# Patient Record
Sex: MALE | Race: BLACK OR AFRICAN AMERICAN | NOT HISPANIC OR LATINO | Employment: FULL TIME | URBAN - NONMETROPOLITAN AREA
[De-identification: names, ages, dates, MRNs, and addresses within clinical notes are randomized per-mention and may not be internally consistent; named-entity substitution may affect disease eponyms.]

---

## 2020-12-23 ENCOUNTER — TRANSCRIBE ORDERS (OUTPATIENT)
Dept: PHYSICAL THERAPY | Facility: HOSPITAL | Age: 50
End: 2020-12-23

## 2020-12-23 DIAGNOSIS — M23.306 OTHER MENISCUS DERANGEMENTS, UNSPECIFIED MENISCUS, RIGHT KNEE: Primary | ICD-10-CM

## 2020-12-28 ENCOUNTER — HOSPITAL ENCOUNTER (OUTPATIENT)
Dept: PHYSICAL THERAPY | Facility: HOSPITAL | Age: 50
Setting detail: THERAPIES SERIES
Discharge: HOME | End: 2020-12-28

## 2020-12-28 DIAGNOSIS — M23.306 DERANGEMENT OF MENISCUS, RIGHT: Primary | ICD-10-CM

## 2020-12-28 PROCEDURE — 97161 PT EVAL LOW COMPLEX 20 MIN: CPT

## 2020-12-28 NOTE — THERAPY EVALUATION
Outpatient Physical Therapy Ortho Initial Evaluation  [facility]     Patient Name: Leonard Beltran Jr.  : 1970  MRN: [id number]  Today's Date: 2020      Visit Date: 2020     ATTENDANCE:   SUBJECTIVE IMPROVEMENT: n/a  NEXT MD APPOINTMENT: 2020  RECERT DATE: 2020    THERAPY DIAGNOSIS: R ACL tear- pre-op      There is no problem list on file for this patient.       History reviewed. No pertinent past medical history.     Past Surgical History:   Procedure Laterality Date   • KNEE ARTHROSCOPY Right     2 scopes and 1 other surgery for R knee injury from football about 26 years ago.       Visit Dx:     ICD-10-CM ICD-9-CM   1. Derangement of meniscus, right  M23.306 717.5         Patient History     Row Name 20 1500             History    Chief Complaint  Pain  -AC      Type of Pain  Knee pain  -AC      Brief Description of Current Complaint  Pt notes that he was hopping off a flat bed truck and heard a pop and had immediate swelling. He notes initial injury 10/15/2020. He notes that since injury he has had x-ray and MRI which showed ACL tear. He notes that he has brace from knee injury about 26 years ago and throughout it with him but he has not used it to this point and MD has not stated that he needs. he notes that he sees MD on 2021 to speak with surgeon about surgical options. He notes that he has very active job notes that currently he is on light duty at work and has 6 kids at home with wife who help out as needed.   -AC      Patient/Caregiver Goals  Return to work;Improve mobility;Improve strength  -AC      Current Tobacco Use  no  -AC      Patient's Rating of General Health  Good  -AC      Occupation/sports/leisure activities  .   -AC      What clinical tests have you had for this problem?  X-ray;MRI  -AC         Pain     Pain Location  Knee  -AC      Pain at Present  0  -AC      Pain at Best  0  -AC      Pain at Worst  10  -AC      Pain Frequency   Rarely  -AC      What Performance Factors Make the Current Problem(s) WORSE?  has been taking it easy so has not hurt in a while.   -AC      Is your sleep disturbed?  No  -AC      Difficulties at work?  light duty only per MD.   -AC      Difficulties with ADL's?  Notes he has to be careful stepping into/out of shower.   -AC         Fall Risk Assessment    Any falls in the past year:  No  -AC         Daily Activities    Primary Language  English  -AC        User Key  (r) = Recorded By, (t) = Taken By, (c) = Cosigned By    Initials Name Provider Type    AC Adelina Murphy, PT Physical Therapist          PT Ortho     Row Name 12/28/20 1500       Subjective Comments    Subjective Comments  see pt hx.   -AC       Precautions and Contraindications    Precautions/Limitations  no known precautions/limitations  -AC       Subjective Pain    Able to rate subjective pain?  yes  -AC    Pre-Treatment Pain Level  0  -AC    Post-Treatment Pain Level  0  -AC       Posture/Observations    Posture/Observations Comments  good gait mechanics noted this date. no AD or bracing.   -AC       Knee Palpation    Medial Joint Line  Right:;Tender  -AC       Patellar Accessory Motions    Superior glide  Right:;WNL  -AC    Inferior glide  Right:;WNL  -AC    Medial glide  Right:;WNL  -AC    Lateral glide  Right:;WNL  -AC       Right Lower Ext    Rt Knee Extension/Flexion AROM  0-132  -AC       MMT (Manual Muscle Testing)    General MMT Comments  LLE 5/5. RLE 4+/5 hip flexion/adduction and knee extension.   -AC       Sensation    Sensation WNL?  WNL  -AC    Additional Comments  notes numbness along anterior/inferior patella since previous sx.   -AC       Balance Skills Training    SLS  10s bilaterally with increased difficulty with RLE.  -AC    Balance Comments  tandem stance 30s bilaterally no sway.   -AC       Gait/Stairs (Locomotion)    Comment (Gait/Stairs)  good gait mechanics noted this date. SL eccentric lowering on RL increased medial  knee pain.   -AC      User Key  (r) = Recorded By, (t) = Taken By, (c) = Cosigned By    Initials Name Provider Type    Adelina Brambila, PT Physical Therapist                      Therapy Education  Education Details: HS, piriformis, and gastroc/soleus stretching. SLR, hip abduction, HS curls, LAQ  Given: HEP  Program: New  How Provided: Verbal, Demonstration, Written  Provided to: Patient  Level of Understanding: Verbalized     PT OP Goals     Row Name 12/28/20 1500          PT Short Term Goals    STG Date to Achieve  01/25/21  -AC     STG 1  independent with HEP.   -AC     STG 1 Progress  New  -AC     STG 2  No increase in pain with HEP/treatment sessions.   -AC     STG 2 Progress  New  -AC     STG 3  Pt has improved RLE strength 5/5 in all planes.   -AC     STG 3 Progress  New  -AC     STG 4  SLS eyes open and closed 15s bilaterally with no LOB.   -AC     STG 4 Progress  New  -AC     STG 5  No flexibility limitations in HS and piriformis/external rotators noted.   -AC     STG 5 Progress  New  -        Time Calculation    PT Goal Re-Cert Due Date  01/18/21  -       User Key  (r) = Recorded By, (t) = Taken By, (c) = Cosigned By    Initials Name Provider Type    Adelina Brambila, PT Physical Therapist          PT Assessment/Plan     Row Name 12/28/20 1500          PT Assessment    Functional Limitations  Impaired locomotion;Performance in work activities;Limitations in functional capacity and performance  -AC     Impairments  Balance;Coordination;Endurance;Impaired flexibility;Muscle strength  -AC     Assessment Comments  The pt is a 49 y/o male who presents with ACL tear following injury at work. He is currently waiting to have appointment with surgeon regarding surgery options for repair and he stats he would like to have repair done if MD recommends due to demand on his knee from his job. He presents with AROM WNL and minimal pain this date. He has decreased HS, hip external rotator, and  gastroc/soleus flexibility limitations noted this date. He has mild decreased RLE hip flexion, adduction, and knee extension noted with comparison to LLE. He notes previous hx of R knee injuries. He has mild decreased gait speed with 1100 ft in 6 MWT however good gait mechanics are noted. Pain increase with RLE SL eccentric lowering shows decreased motor control. He has increased difficulty with RLE SLS however is able to complete 10s hold. He is appropriate for skilled PT to improve flexibility and strength in the RLE to prepare for possible surgery to allow for improved post-op rehab and improve recovery time to allow for RTW as soon as possible. Pt was given HEP this date for stretching/strength activities to complete at home. He was educated on POC for 1x per week unless surgeon suggests otherwise at next follow up appointment regarding surgical options.   -AC     Please refer to paper survey for additional self-reported information  Yes  -AC     Rehab Potential  Good  -AC     Patient/caregiver participated in establishment of treatment plan and goals  Yes  -AC     Patient would benefit from skilled therapy intervention  Yes  -AC        PT Plan    PT Frequency  1x/week  -AC     Predicted Duration of Therapy Intervention (PT)  4 weeks   -AC     PT Plan Comments  LE stretching/strength, balance/stability training, and motor control training in the RLE for pre-op rehab.   -AC       User Key  (r) = Recorded By, (t) = Taken By, (c) = Cosigned By    Initials Name Provider Type    Adelina Brambila PT Physical Therapist            OP Exercises     Row Name 12/28/20 1500             Subjective Comments    Subjective Comments  see pt hx.   -AC         Subjective Pain    Able to rate subjective pain?  yes  -AC      Pre-Treatment Pain Level  0  -AC      Post-Treatment Pain Level  0  -AC        User Key  (r) = Recorded By, (t) = Taken By, (c) = Cosigned By    Initials Name Provider Type    Adelina Brambila, PT  Physical Therapist                        Outcome Measure Options: 6 Minute Walk Test, Lower Extremity Functional Scale (LEFS)  6 Minute Walk Test  Distance: 1100  Device Used: No  # of Rest Breaks: 0  6 Minute Walk Comments: mild decreased   Lower Extremity Functional Index  Any of your usual work, housework or school activities: No difficulty  Your usual hobbies, recreational or sporting activities: Quite a bit of difficulty  Getting into or out of the bath: No difficulty  Walking between rooms: No difficulty  Putting on your shoes or socks: No difficulty  Squatting: Moderate difficulty  Lifting an object, like a bag of groceries from the floor: No difficulty  Performing light activities around your home: No difficulty  Performing heavy activities around your home: A little bit of difficulty  Getting into or out of a car: No difficulty  Walking 2 blocks: A little bit of difficulty  Walking a mile: A little bit of difficulty  Going up or down 10 stairs (about 1 flight of stairs): No difficulty  Standing for 1 hour: No difficulty  Sitting for 1 hour: No difficulty  Running on even ground: A little bit of difficulty  Running on uneven ground: A little bit of difficulty  Making sharp turns while running fast: Quite a bit of difficulty  Hopping: A little bit of difficulty  Rolling over in bed: No difficulty  Total: 66      Time Calculation:     Start Time: 1500  Stop Time: 1540  Time Calculation (min): 40 min     Therapy Charges for Today     Code Description Service Date Service Provider Modifiers Qty    25226881829 HC PT EVAL LOW COMPLEXITY 3 12/28/2020 Adelina Murphy, PT GP 1                   Adelina Murphy, PT  12/28/2020

## 2021-01-08 ENCOUNTER — HOSPITAL ENCOUNTER (OUTPATIENT)
Dept: PHYSICAL THERAPY | Facility: HOSPITAL | Age: 51
Setting detail: THERAPIES SERIES
Discharge: HOME | End: 2021-01-08

## 2021-01-08 DIAGNOSIS — M23.306 DERANGEMENT OF MENISCUS, RIGHT: Primary | ICD-10-CM

## 2021-01-08 PROCEDURE — 97110 THERAPEUTIC EXERCISES: CPT

## 2021-01-08 NOTE — THERAPY TREATMENT NOTE
Outpatient Physical Therapy Ortho Treatment Note  [facility]     Patient Name: Leonard Beltran Jr.  : 1970  MRN: [id number]  Today's Date: 2021      Visit Date: 2021  Subjective Improvement: 0  MD visit: ELIZABETH  Visit Number:   Total Approved:4 visits  Recert Date: 2021  Visit Dx:    ICD-10-CM ICD-9-CM   1. Derangement of meniscus, right  M23.306 717.5       There is no problem list on file for this patient.       No past medical history on file.     Past Surgical History:   Procedure Laterality Date   • KNEE ARTHROSCOPY Right     2 scopes and 1 other surgery for R knee injury from football about 26 years ago.       PT Ortho     Row Name 21 1000       Subjective Comments    Subjective Comments  pt reports will have TKR on right.  -TL       Precautions and Contraindications    Precautions/Limitations  no known precautions/limitations  -TL       Subjective Pain    Able to rate subjective pain?  yes  -TL    Pre-Treatment Pain Level  0  -TL    Post-Treatment Pain Level  0  -TL      User Key  (r) = Recorded By, (t) = Taken By, (c) = Cosigned By    Initials Name Provider Type    Sloane Rodriguez, KAREEN Physical Therapy Assistant                      PT Assessment/Plan     Row Name 21 1000          PT Assessment    Assessment Comments  Pt denies pain. Pt will have a TKE on right knee. No swelling. Pt tolerated new ex this date. PTA added to pt's HEP. No c/o pain after treatment.   -TL        PT Plan    PT Frequency  1x/week  -TL     Predicted Duration of Therapy Intervention (PT)  4 weeks  -TL     PT Plan Comments  add St TKE, SLS  -TL       User Key  (r) = Recorded By, (t) = Taken By, (c) = Cosigned By    Initials Name Provider Type    Sloane Rodriguez PTA Physical Therapy Assistant            OP Exercises     Row Name 21 1000             Subjective Comments    Subjective Comments  pt reports will have TKR on right.  -TL         Subjective Pain    Able to rate  subjective pain?  yes  -TL      Pre-Treatment Pain Level  0  -TL      Post-Treatment Pain Level  0  -TL         Exercise 1    Exercise Name 1  pro ll legs level 4  -TL      Time 1  10 mins  -TL      Additional Comments  strengthening  -TL         Exercise 2    Exercise Name 2  incline S  -TL      Reps 2  2  -TL      Time 2  30 sec hold  -TL         Exercise 3    Exercise Name 3  st ham S  -TL      Reps 3  2  -TL      Time 3  30 sec hold  -TL         Exercise 4    Exercise Name 4  lunge s  -TL      Reps 4  2  -TL      Time 4  30  -TL         Exercise 5    Exercise Name 5  553 ex  -TL      Reps 5  2  -TL         Exercise 6    Exercise Name 6  4-way SLR  -TL      Reps 6  20  -TL      Time 6  5 sec hold  -TL        User Key  (r) = Recorded By, (t) = Taken By, (c) = Cosigned By    Initials Name Provider Type    Sloane Rodriguez PTA Physical Therapy Assistant                       PT OP Goals     Row Name 01/08/21 1100          PT Short Term Goals    STG Date to Achieve  01/25/21  -TL     STG 1  independent with HEP.   -TL     STG 1 Progress  Ongoing;Progressing  -TL     STG 2  No increase in pain with HEP/treatment sessions.   -TL     STG 2 Progress  Met  -TL     STG 3  Pt has improved RLE strength 5/5 in all planes.   -TL     STG 3 Progress  Ongoing;Progressing  -TL     STG 4  SLS eyes open and closed 15s bilaterally with no LOB.   -TL     STG 4 Progress  Ongoing  -TL     STG 5  No flexability limitations in HS and piriformis/external rotators noted.   -TL     STG 5 Progress  Ongoing  -TL        Time Calculation    PT Goal Re-Cert Due Date  01/18/21  -TL       User Key  (r) = Recorded By, (t) = Taken By, (c) = Cosigned By    Initials Name Provider Type    Sloane Rodriguez PTA Physical Therapy Assistant          Therapy Education  Education Details: long sitting ham S, lunge S, 4-way SLR, 553 ex  Given: HEP, Symptoms/condition management, Pain management  Program: New, Reinforced  How Provided: Verbal,  Demonstration, Written  Provided to: Patient  Level of Understanding: Teach back education performed, Verbalized, Demonstrated              Time Calculation:      Therapy Charges for Today     Code Description Service Date Service Provider Modifiers Qty    80957247152 HC PT THER PROC EA 15 MIN 1/8/2021 Sloane Koenig, PTA GP 3                    Sloane Koenig PTA  1/8/2021

## 2021-01-12 ENCOUNTER — HOSPITAL ENCOUNTER (OUTPATIENT)
Dept: PHYSICAL THERAPY | Facility: HOSPITAL | Age: 51
Setting detail: THERAPIES SERIES
Discharge: HOME | End: 2021-01-12

## 2021-01-12 DIAGNOSIS — M23.306 DERANGEMENT OF MENISCUS, RIGHT: Primary | ICD-10-CM

## 2021-01-12 PROCEDURE — 97110 THERAPEUTIC EXERCISES: CPT

## 2021-01-12 NOTE — THERAPY TREATMENT NOTE
Outpatient Physical Therapy Ortho Treatment Note  [facility]     Patient Name: Leonard Beltran Jr.  : 1970  MRN: [id number]  Today's Date: 2021      Visit Date: 2021  Subjective Improvement: 0  MD visit: ELIZABETH  Visit Number: 3/3  Total Approved:4 visits  Recert Date: 2021  Visit Dx:    ICD-10-CM ICD-9-CM   1. Derangement of meniscus, right  M23.306 717.5       There is no problem list on file for this patient.       No past medical history on file.     Past Surgical History:   Procedure Laterality Date   • KNEE ARTHROSCOPY Right     2 scopes and 1 other surgery for R knee injury from football about 26 years ago.       PT Ortho     Row Name 21 1300       Subjective Comments    Subjective Comments  Pt denies pain. Pt reports still waiting on approval for surgery.  -TL       Precautions and Contraindications    Precautions/Limitations  no known precautions/limitations  -TL       Subjective Pain    Able to rate subjective pain?  yes  -TL    Pre-Treatment Pain Level  0  -TL      User Key  (r) = Recorded By, (t) = Taken By, (c) = Cosigned By    Initials Name Provider Type    TL Sloane Koenig PTA Physical Therapy Assistant                      PT Assessment/Plan     Row Name 21 1300          PT Assessment    Assessment Comments  Pt has met short term goals 2 and 4 . Pt working toward strengthening and stretches. No new c/o's this date. Pt progress well toward goals.  -TL        PT Plan    PT Frequency  1x/week  -TL     Predicted Duration of Therapy Intervention (PT)  4 weeks  -TL     PT Plan Comments  add heel to toe gait on balance beam next visit and marching in place on airex  -TL       User Key  (r) = Recorded By, (t) = Taken By, (c) = Cosigned By    Initials Name Provider Type    Sloane Rodriguez PTA Physical Therapy Assistant          Modalities     Row Name 21 1300             Subjective Pain    Post-Treatment Pain Level  0  -TL        User Key  (r) = Recorded  By, (t) = Taken By, (c) = Cosigned By    Initials Name Provider Type    TL Sloane Koenig PTA Physical Therapy Assistant        OP Exercises     Row Name 01/12/21 1300             Subjective Comments    Subjective Comments  Pt denies pain. Pt reports still waiting on approval for surgery.  -TL         Subjective Pain    Able to rate subjective pain?  yes  -TL      Pre-Treatment Pain Level  0  -TL      Post-Treatment Pain Level  0  -TL         Exercise 1    Exercise Name 1  pro ll legs level 4.5  -TL      Time 1  10 mins  -TL      Additional Comments  for strengthening  -TL         Exercise 2    Exercise Name 2  incline S  -TL      Reps 2  2  -TL      Time 2  30 sec hold  -TL         Exercise 3    Exercise Name 3  long sitting Ham S  -TL      Reps 3  5  -TL      Time 3  30 sec hold  -TL         Exercise 4    Exercise Name 4  SLS eyes opened  -TL      Reps 4  1  -TL      Additional Comments  42.46  -TL         Exercise 5    Exercise Name 5  SLS eyes closed  -TL      Reps 5  1  -TL      Time 5  27.03  -TL         Exercise 6    Exercise Name 6  sit to stands with ball between knees  -TL      Reps 6  20  -TL      Additional Comments  low surface  -TL         Exercise 7    Exercise Name 7  lunge S  -TL      Reps 7  2  -TL      Time 7  30  -TL         Exercise 8    Exercise Name 8  step up fwds  -TL      Reps 8  20  -TL      Additional Comments  6'  -TL         Exercise 9    Exercise Name 9  step up lat  -TL      Reps 9  20  -TL      Additional Comments  6'  -TL         Exercise 10    Exercise Name 10  step downs ecc  -TL      Reps 10  20  -TL         Exercise 11    Exercise Name 11  st TKE with ball squeezes  -TL      Reps 11  20  -TL         Exercise 12    Exercise Name 12  cybex leg press 2l  -TL      Reps 12  2/10  -TL      Additional Comments  #130  -TL        User Key  (r) = Recorded By, (t) = Taken By, (c) = Cosigned By    Initials Name Provider Type    TL Sloane Koenig PTA Physical Therapy Assistant                        PT OP Goals     Row Name 01/12/21 1300          PT Short Term Goals    STG Date to Achieve  01/25/21  -TL     STG 1  independent with HEP.   -TL     STG 1 Progress  Ongoing;Progressing  -TL     STG 2  No increase in pain with HEP/treatment sessions.   -TL     STG 2 Progress  Met  -TL     STG 3  Pt has improved RLE strength 5/5 in all planes.   -TL     STG 3 Progress  Ongoing;Progressing  -TL     STG 4  SLS eyes open and closed 15s bilaterally with no LOB.   -TL     STG 4 Progress  Met  -TL     STG 5  No flexability limitations in HS and piriformis/external rotators noted.   -TL     STG 5 Progress  Ongoing  -TL        Time Calculation    PT Goal Re-Cert Due Date  01/18/21  -TL       User Key  (r) = Recorded By, (t) = Taken By, (c) = Cosigned By    Initials Name Provider Type    Sloane Rodriguez PTA Physical Therapy Assistant                         Time Calculation:   Start Time: 1300  Stop Time: 1346  Time Calculation (min): 46 min  Total Timed Code Minutes- PT: 46 minute(s)  Therapy Charges for Today     Code Description Service Date Service Provider Modifiers Qty    92847048971 HC PT THER PROC EA 15 MIN 1/12/2021 Sloane Koenig PTA GP 3                    Sloane Koenig PTA  1/12/2021

## 2021-01-19 ENCOUNTER — APPOINTMENT (OUTPATIENT)
Dept: PHYSICAL THERAPY | Facility: HOSPITAL | Age: 51
End: 2021-01-19

## 2021-02-24 ENCOUNTER — TRANSCRIBE ORDERS (OUTPATIENT)
Dept: PHYSICAL THERAPY | Facility: HOSPITAL | Age: 51
End: 2021-02-24

## 2021-02-24 DIAGNOSIS — Z96.651 PRESENCE OF RIGHT ARTIFICIAL KNEE JOINT: Primary | ICD-10-CM

## 2021-02-24 DIAGNOSIS — M25.561 RIGHT KNEE PAIN, UNSPECIFIED CHRONICITY: ICD-10-CM

## 2021-03-05 ENCOUNTER — APPOINTMENT (OUTPATIENT)
Dept: PHYSICAL THERAPY | Facility: HOSPITAL | Age: 51
End: 2021-03-05

## 2021-06-29 ENCOUNTER — TRANSCRIBE ORDERS (OUTPATIENT)
Dept: PHYSICAL THERAPY | Facility: HOSPITAL | Age: 51
End: 2021-06-29

## 2021-06-29 DIAGNOSIS — Z98.890 STATUS POST ARTHROSCOPY OF RIGHT KNEE: ICD-10-CM

## 2021-06-29 DIAGNOSIS — S83.231D COMPLEX TEAR OF MEDIAL MENISCUS OF RIGHT KNEE AS CURRENT INJURY, SUBSEQUENT ENCOUNTER: Primary | ICD-10-CM

## 2021-07-01 ENCOUNTER — HOSPITAL ENCOUNTER (OUTPATIENT)
Dept: PHYSICAL THERAPY | Facility: HOSPITAL | Age: 51
Setting detail: THERAPIES SERIES
Discharge: HOME | End: 2021-07-01

## 2021-07-01 DIAGNOSIS — S83.231A COMPLEX TEAR OF MEDIAL MENISCUS OF RIGHT KNEE, UNSPECIFIED WHETHER OLD OR CURRENT TEAR, INITIAL ENCOUNTER: Primary | ICD-10-CM

## 2021-07-01 PROCEDURE — 97161 PT EVAL LOW COMPLEX 20 MIN: CPT | Performed by: PHYSICAL THERAPIST

## 2021-07-01 PROCEDURE — 97110 THERAPEUTIC EXERCISES: CPT | Performed by: PHYSICAL THERAPIST

## 2021-07-01 NOTE — THERAPY EVALUATION
Outpatient Physical Therapy Ortho Initial Evaluation  [facility]     Patient Name: Leonard Beltran Jr.  : 1970  MRN: [id number]  Today's Date: 2021      Visit Date: 2021  Visit   Return to MD: ELIZABETH  Re-cert date: N/A  There is no problem list on file for this patient.       Past Medical History:   Diagnosis Date   • Arthritis         Past Surgical History:   Procedure Laterality Date   • KNEE ACL RECONSTRUCTION Right    • KNEE ARTHROSCOPY Right     2 scopes and 1 other surgery for R knee injury from football about 26 years ago.   • TONSILLECTOMY         Visit Dx:     ICD-10-CM ICD-9-CM   1. Complex tear of medial meniscus of right knee, unspecified whether old or current tear, initial encounter  S83.231A 836.0     Meds: none  Allergies: NKA        PT Ortho     Row Name 21 1105       Subjective Comments    Subjective Comments  49 yo male with a work related injury on 10/15/20 when he stepped down and twisted his R knee and the R knee buckled. MRI evidence of a complex tear to the R medial meniscus. Now s/p arthroscopy for the R knee with Dr. Conway on 6/15/21. Since surgery, has had some edema since surgery.   -BS       Precautions and Contraindications    Precautions/Limitations  no known precautions/limitations  -BS       Subjective Pain    Able to rate subjective pain?  yes  -BS    Pre-Treatment Pain Level  0  -BS    Post-Treatment Pain Level  0  -BS    Subjective Pain Comment  6/10 intermittent R knee   -BS       Posture/Observations    Posture/Observations Comments  mild R knee edema  -BS       General ROM    GENERAL ROM COMMENTS  AROM: R knee 4-116° L knee 0-129°   -BS       MMT (Manual Muscle Testing)    General MMT Comments  MMT: R knee 5/5 (flex/ext)  R hip 5/5 (flex/abd/add/ext)  L knee 5/5-(flex/ext)   -BS       Sensation    Light Touch  Partial deficits in the RLE  -BS       Balance Skills Training    SLS  27 sec on R, 30 sec on L  -BS    Balance Comments  moderate R knee  instability noted with R>L LE  -BS       Gait/Stairs (Locomotion)    Comment (Gait/Stairs)  up/down 2-3 portable steps x 2 reps with antalgic gait   -BS      User Key  (r) = Recorded By, (t) = Taken By, (c) = Cosigned By    Initials Name Provider Type    Amandeep Kaur, PT Physical Therapist                            PT OP Goals     Row Name 07/01/21 1100          PT Short Term Goals    STG Date to Achieve  07/16/21  -BS     STG 1  Patient independent with HEP  -BS     STG 1 Progress  New  -BS     STG 2  Improve R SLS to 30 sec with good R knee stability  -BS     STG 2 Progress  New  -BS     STG 3  Improve R knee AROM to 0-130°  -BS     STG 3 Progress  New  -BS     STG 4  Reduce R knee pain to 0/10 with prolonged standing.   -BS     STG 4 Progress  New  -BS        Time Calculation    PT Goal Re-Cert Due Date  07/22/21  -BS       User Key  (r) = Recorded By, (t) = Taken By, (c) = Cosigned By    Initials Name Provider Type    Amandeep Kaur, PT Physical Therapist          PT Assessment/Plan     Row Name 07/01/21 1100          PT Assessment    Functional Limitations  Impaired gait;Performance in sport activities;Performance in work activities  -BS     Impairments  Balance;Endurance;Gait;Range of motion  -BS     Assessment Comments  49 yo male with R knee pain s/p arthroscopy due to a medial R meniscus tear. Presents with R knee pain with increased wt bearing, restricted R knee AROM, impaired standing balance and poor eccentric control of the quads with climbing stairs.    -BS     Please refer to paper survey for additional self-reported information  Yes  -BS     Rehab Potential  Excellent  -BS     Patient/caregiver participated in establishment of treatment plan and goals  Yes  -BS     Patient would benefit from skilled therapy intervention  Yes  -BS        PT Plan    PT Frequency  1x/week;2x/week  -BS     Predicted Duration of Therapy Intervention (PT)  2-3 weeks  -BS     Planned CPT's?  PT EVAL LOW COMPLEXITY:  48308;PT RE-EVAL: 14018;PT THER PROC EA 15 MIN: 88774;PT THER ACT EA 15 MIN: 98142;PT MANUAL THERAPY EA 15 MIN: 07734;PT NEUROMUSC RE-EDUCATION EA 15 MIN: 53468;PT GAIT TRAINING EA 15 MIN: 59254;PT ELECTRICAL STIM UNATTEND: ;PT HOT/COLD PACK WC NONMCARE: 91966;PT THER SUPP EA 15 MIN  -BS     Physical Therapy Interventions (Optional Details)  balance training;home exercise program;manual therapy techniques;modalities;neuromuscular re-education;patient/family education;ROM (Range of Motion);stair training;strengthening;stretching  -BS     PT Plan Comments  address eccentric loading of quads, fwd lunge on BOSU, SLS w/ ball toss against trampoline, 553's.   -BS       User Key  (r) = Recorded By, (t) = Taken By, (c) = Cosigned By    Initials Name Provider Type    Amandeep Kaur, PT Physical Therapist          Modalities     Row Name 07/01/21 1105             Ice    Ice Applied  No deferred  -BS        User Key  (r) = Recorded By, (t) = Taken By, (c) = Cosigned By    Initials Name Provider Type    Amandeep Kaur, PT Physical Therapist        OP Exercises     Row Name 07/01/21 1105             Precautions    Existing Precautions/Restrictions  no known precautions/restrictions  -BS         Subjective Comments    Subjective Comments  49 yo male with a work related injury on 10/15/20 when he stepped down and twisted his R knee and the R knee buckled. MRI evidence of a complex tear to the R medial meniscus. Now s/p arthroscopy for the R knee with Dr. Conway on 6/15/21. Since surgery, has had some edema since surgery.   -BS         Subjective Pain    Able to rate subjective pain?  yes  -BS      Pre-Treatment Pain Level  0  -BS      Post-Treatment Pain Level  0  -BS      Subjective Pain Comment  6/10 intermittent R knee   -BS         Exercise 1    Exercise Name 1  unilateral heel raises, R  -BS      Sets 1  2  -BS      Reps 1  10  -BS         Exercise 2    Exercise Name 2  resisted TKE w/ blue TB  -BS      Sets 2  1   "-BS      Reps 2  20  -BS         Exercise 3    Exercise Name 3  step downs, 6\"   -BS      Sets 3  1  -BS      Reps 3  10  -BS         Exercise 4    Exercise Name 4  lateral step down, 6\"  -BS      Sets 4  1  -BS      Reps 4  10  -BS         Exercise 5    Exercise Name 5  incline board S  -BS      Sets 5  1  -BS      Reps 5  3  -BS      Time 5  30\" hold  -BS         Exercise 6    Exercise Name 6  SLS, R  -BS      Sets 6  1  -BS      Reps 6  3  -BS      Time 6  30\" hold  -BS        User Key  (r) = Recorded By, (t) = Taken By, (c) = Cosigned By    Initials Name Provider Type    Amandeep Kaur, PT Physical Therapist                        Outcome Measure Options: Lower Extremity Functional Scale (LEFS)  Lower Extremity Functional Index  Any of your usual work, housework or school activities: Extreme difficulty or unable to perform activity  Your usual hobbies, recreational or sporting activities: Extreme difficulty or unable to perform activity  Getting into or out of the bath: A little bit of difficulty  Walking between rooms: A little bit of difficulty  Putting on your shoes or socks: A little bit of difficulty  Squatting: Moderate difficulty  Lifting an object, like a bag of groceries from the floor: No difficulty  Performing light activities around your home: No difficulty  Performing heavy activities around your home: Quite a bit of difficulty  Getting into or out of a car: No difficulty  Walking 2 blocks: A little bit of difficulty  Walking a mile: Extreme difficulty or unable to perform activity  Going up or down 10 stairs (about 1 flight of stairs): Quite a bit of difficulty  Standing for 1 hour: A little bit of difficulty  Sitting for 1 hour: No difficulty  Running on even ground: Extreme difficulty or unable to perform activity  Running on uneven ground: Extreme difficulty or unable to perform activity  Making sharp turns while running fast: Extreme difficulty or unable to perform activity  Hopping: Extreme " difficulty or unable to perform activity  Rolling over in bed: A little bit of difficulty  Total: 38      Time Calculation:     Start Time: 1105  Stop Time: 1158  Time Calculation (min): 53 min  Total Timed Code Minutes- PT: 53 minute(s)     Therapy Charges for Today     Code Description Service Date Service Provider Modifiers Qty    20202621343 HC PT EVAL LOW COMPLEXITY 3 7/1/2021 Amandeep Yusuf, PT GP 1    12633181234 HC PT THER PROC EA 15 MIN 7/1/2021 Amandeep Yusuf, PT GP 1          PT G-Codes  Outcome Measure Options: Lower Extremity Functional Scale (LEFS)  Total: 38         Amandeep Yusuf, PT  7/1/2021

## 2021-07-07 ENCOUNTER — HOSPITAL ENCOUNTER (OUTPATIENT)
Dept: PHYSICAL THERAPY | Facility: HOSPITAL | Age: 51
Setting detail: THERAPIES SERIES
Discharge: HOME | End: 2021-07-07

## 2021-07-07 DIAGNOSIS — M23.306 DERANGEMENT OF MENISCUS, RIGHT: ICD-10-CM

## 2021-07-07 DIAGNOSIS — S83.231A COMPLEX TEAR OF MEDIAL MENISCUS OF RIGHT KNEE, UNSPECIFIED WHETHER OLD OR CURRENT TEAR, INITIAL ENCOUNTER: Primary | ICD-10-CM

## 2021-07-07 NOTE — THERAPY TREATMENT NOTE
Outpatient Physical Therapy Ortho Treatment Note  [facility]     Patient Name: Leonard Beltran Jr.  : 1970  MRN: [id number]  Today's Date: 2021      Visit Date: 2021  Subjective Improvement: some  MD visit: ELIZABETH  Visit Number:   Total Approved:12 approved  Recert Date: N/A  Visit Dx:    ICD-10-CM ICD-9-CM   1. Complex tear of medial meniscus of right knee, unspecified whether old or current tear, initial encounter  S83.231A 836.0   2. Derangement of meniscus, right  M23.306 717.5       There is no problem list on file for this patient.       Past Medical History:   Diagnosis Date   • Arthritis         Past Surgical History:   Procedure Laterality Date   • KNEE ACL RECONSTRUCTION Right    • KNEE ARTHROSCOPY Right     2 scopes and 1 other surgery for R knee injury from football about 26 years ago.   • TONSILLECTOMY         PT Ortho     Row Name 21 1500       Subjective Comments    Subjective Comments  pt asked about riding his bike. PTA asked PT . PT going to call the doctor to advise on bike.  -TL       Precautions and Contraindications    Precautions/Limitations  no known precautions/limitations  -TL       Subjective Pain    Able to rate subjective pain?  yes  -TL    Pre-Treatment Pain Level  0  -TL      User Key  (r) = Recorded By, (t) = Taken By, (c) = Cosigned By    Initials Name Provider Type    Sloane Rodriguez, PTA Physical Therapy Assistant                      PT Assessment/Plan     Row Name 21 1600          PT Assessment    Assessment Comments  No c/o pain before and after therapy. Pt reported some discomfort with step down and fwd lunge on bosu. pt tolerated 4-way SLR for strengthening. pt also tolerated bike all without increase pain. pt left without pain. No sign of infection this date. pt met SLS stance goals. Pt able to stand over a minute.   -TL        PT Plan    PT Frequency  1x/week;2x/week  -TL     Predicted Duration of Therapy Intervention (PT)  2-3 weeks   -TL     PT Plan Comments  therapist to call MD john riding bike at home.  -TL       User Key  (r) = Recorded By, (t) = Taken By, (c) = Cosigned By    Initials Name Provider Type    Sloane Rodriguez PTA Physical Therapy Assistant          Modalities     Row Name 07/07/21 1500             Subjective Pain    Post-Treatment Pain Level  0  -TL         Ice    Patient denies application of Ice  Yes  -TL      Patient reports will apply ice at home to involved area  Yes  -TL        User Key  (r) = Recorded By, (t) = Taken By, (c) = Cosigned By    Initials Name Provider Type    Sloane Rodriguez PTA Physical Therapy Assistant        OP Exercises     Row Name 07/07/21 1600 07/07/21 1500          Subjective Comments    Subjective Comments  --  pt asked about riding his bike. PTA asked PT . PT going to call the doctor to advise on bike.  -TL        Subjective Pain    Able to rate subjective pain?  --  yes  -TL     Pre-Treatment Pain Level  --  0  -TL     Post-Treatment Pain Level  --  0  -TL        Total Minutes    17046 - PT Therapeutic Exercise Minutes  42  -TL  --     48523 - PT Therapeutic Activity Minutes  10  -TL  --        Exercise 1    Exercise Name 1  --  553  -TL     Reps 1  --  2  -TL     Time 1  --  5 sec hold  -TL        Exercise 2    Exercise Name 2  --  st incline S  -TL     Reps 2  --  2  -TL     Time 2  --  30 sec hold  -TL        Exercise 3    Exercise Name 3  --  st ham S  -TL     Sets 3  --  2  -TL     Time 3  --  30 sec hold  -TL        Exercise 4    Exercise Name 4  --  lunge  -TL     Sets 4  --  2  -TL     Time 4  --  30 sec hold  -TL        Exercise 5    Exercise Name 5  --  ball toss SLS on right  -TL     Sets 5  --  2  -TL     Time 5  --  46.95, 1.05  -TL        Exercise 6    Exercise Name 6  --  step down ecc 4'  -TL     Sets 6  --  2  -TL     Reps 6  --  10  -TL        Exercise 7    Exercise Name 7  --  lunge fwd on BOSU  -TL     Sets 7  --  2  -TL     Reps 7  --  10  -TL        Exercise  8    Exercise Name 8  --  4-way SLR on Right  -TL     Sets 8  --  2  -TL     Reps 8  --  10  -TL        Exercise 9    Exercise Name 9  --  Pro ll LE for strengthening and ROM  -TL     Time 9  --  10 mins  -TL     Additional Comments  --  level 3  -TL        Exercise 10    Exercise Name 10  --  step up fwd  -TL     Reps 10  --  20  -TL       User Key  (r) = Recorded By, (t) = Taken By, (c) = Cosigned By    Initials Name Provider Type    Sloane Rodriguez PTA Physical Therapy Assistant                       PT OP Goals     Row Name 07/07/21 1500          PT Short Term Goals    STG Date to Achieve  07/16/21  -TL     STG 1  Patient independent with HEP  -TL     STG 1 Progress  Ongoing  -TL     STG 2  Improve R SLS to 30 sec with good R knee stability  -TL     STG 2 Progress  Met  -TL     STG 3  Improve R knee AROM to 0-130°  -TL     STG 3 Progress  Ongoing;Progressing  -TL     STG 4  Reduce R knee pain to 0/10 with prolonged standing.   -TL     STG 4 Progress  Ongoing;Progressing  -TL       User Key  (r) = Recorded By, (t) = Taken By, (c) = Cosigned By    Initials Name Provider Type    Sloane Rodriguez PTA Physical Therapy Assistant          Therapy Education  Education Details: 4- way SLR,ice  Given: HEP, Symptoms/condition management, Pain management  Program: New  How Provided: Verbal, Demonstration, Written  Provided to: Patient  Level of Understanding: Verbalized, Demonstrated              Time Calculation:   Start Time: 1516  Stop Time: 1608  Time Calculation (min): 52 min  Timed Charges  95785 - PT Therapeutic Exercise Minutes: 42  28324 - PT Therapeutic Activity Minutes: 10  Total Minutes  Timed Charges Total Minutes: 52   Total Minutes: 52                Sloane Koenig PTA  7/7/2021

## 2021-07-14 ENCOUNTER — APPOINTMENT (OUTPATIENT)
Dept: PHYSICAL THERAPY | Facility: HOSPITAL | Age: 51
End: 2021-07-14

## 2021-07-15 ENCOUNTER — HOSPITAL ENCOUNTER (OUTPATIENT)
Dept: PHYSICAL THERAPY | Facility: HOSPITAL | Age: 51
Setting detail: THERAPIES SERIES
Discharge: HOME | End: 2021-07-15

## 2021-07-15 DIAGNOSIS — M23.306 DERANGEMENT OF MENISCUS, RIGHT: ICD-10-CM

## 2021-07-15 DIAGNOSIS — S83.231A COMPLEX TEAR OF MEDIAL MENISCUS OF RIGHT KNEE, UNSPECIFIED WHETHER OLD OR CURRENT TEAR, INITIAL ENCOUNTER: Primary | ICD-10-CM

## 2021-07-15 PROCEDURE — 97110 THERAPEUTIC EXERCISES: CPT

## 2021-07-15 NOTE — THERAPY TREATMENT NOTE
"    Outpatient Physical Therapy Ortho Treatment Note  [facility]     Patient Name: Leonard Beltran Jr.  : 1970  MRN: [id number]  Today's Date: 7/15/2021      Visit Date: 07/15/2021   Attendance: 3/4 of 12  Subjective improvement: \"Better\"  Recert: n/a  MD Appointment: TBD      Visit Dx:    ICD-10-CM ICD-9-CM   1. Complex tear of medial meniscus of right knee, unspecified whether old or current tear, initial encounter  S83.231A 836.0   2. Derangement of meniscus, right  M23.306 717.5       There is no problem list on file for this patient.       Past Medical History:   Diagnosis Date   • Arthritis         Past Surgical History:   Procedure Laterality Date   • KNEE ACL RECONSTRUCTION Right    • KNEE ARTHROSCOPY Right     2 scopes and 1 other surgery for R knee injury from football about 26 years ago.   • TONSILLECTOMY         PT Ortho     Row Name 07/15/21 0800       Precautions and Contraindications    Precautions/Limitations  no known precautions/limitations  -EM      User Key  (r) = Recorded By, (t) = Taken By, (c) = Cosigned By    Initials Name Provider Type    EM Constantine Argueta, PTA Physical Therapy Assistant                      PT Assessment/Plan     Row Name 07/15/21 0800          PT Assessment    Assessment Comments  Pt barry tx well with progressed therex(CKC) and resistance this tx. Pt is progressing very well with PT at this time.   -EM        PT Plan    PT Frequency  1x/week;2x/week  -EM     Predicted Duration of Therapy Intervention (PT)  2-3 wks  -EM     PT Plan Comments  Cont current POC with progression as barry.  -EM       User Key  (r) = Recorded By, (t) = Taken By, (c) = Cosigned By    Initials Name Provider Type    EM Constantine Argueta, PTA Physical Therapy Assistant            OP Exercises     Row Name 07/15/21 0800             Subjective Comments    Subjective Comments  Pt went to MD yesterday for f/u. 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 Pt states he is released to " "ride his bike.  -EM         Subjective Pain    Able to rate subjective pain?  yes  -EM      Pre-Treatment Pain Level  0  -EM      Post-Treatment Pain Level  0  -EM         Exercise 1    Exercise Name 1  PRO MB-Svemu-Mitk 8-4.0  -EM      Time 1  10'  -EM         Exercise 2    Exercise Name 2  Incline Calf S  -EM      Sets 2  3  -EM      Time 2  30\"  -EM         Exercise 3    Exercise Name 3  St Ham S  -EM      Sets 3  3  -EM      Time 3  30\"  -EM         Exercise 4    Exercise Name 4  Fwd/Lat Step Up-6\"  -EM      Sets 4  1  -EM      Reps 4  20  -EM         Exercise 5    Exercise Name 5  Eccentric Step Down-6\"  -EM      Sets 5  1  -EM      Reps 5  20  -EM         Exercise 6    Exercise Name 6  MS  -EM      Sets 6  1  -EM      Reps 6  20  -EM         Exercise 7    Exercise Name 7  Sup SLR with AW  -EM      Sets 7  1  -EM      Reps 7  20  -EM      Additional Comments  2# AW  -EM         Exercise 8    Exercise Name 8  LAQ w/ AW  -EM      Sets 8  1  -EM      Reps 8  20  -EM      Additional Comments  2# AW  -EM         Exercise 9    Exercise Name 9  Sup VMO SLR w/ AW  -EM      Sets 9  2  -EM      Reps 9  10  -EM      Additional Comments  2# AW  -EM        User Key  (r) = Recorded By, (t) = Taken By, (c) = Cosigned By    Initials Name Provider Type    Constantine Young, PTA Physical Therapy Assistant                       PT OP Goals     Row Name 07/15/21 0800          PT Short Term Goals    STG Date to Achieve  07/16/21  -EM     STG 1  Patient independent with HEP  -EM     STG 1 Progress  Ongoing  -EM     STG 2  Improve R SLS to 30 sec with good R knee stability  -EM     STG 2 Progress  (S) Met  -EM     STG 3  Improve R knee AROM to 0-130°  -EM     STG 3 Progress  Ongoing;Progressing  -EM     STG 4  Reduce R knee pain to 0/10 with prolonged standing.   -EM     STG 4 Progress  Ongoing;Progressing  -EM       User Key  (r) = Recorded By, (t) = Taken By, (c) = Cosigned By    Initials Name Provider Type    Constantine Young, " PTA Physical Therapy Assistant                         Time Calculation:   Start Time: 0804  Stop Time: 0845  Time Calculation (min): 41 min  Total Timed Code Minutes- PT: 41 minute(s)  Therapy Charges for Today     Code Description Service Date Service Provider Modifiers Qty    77534051502 HC PT THER PROC EA 15 MIN 7/15/2021 Constantine Argueta, PTA GP 3                    Constantine Argueta, PTA  7/15/2021

## 2021-07-19 ENCOUNTER — HOSPITAL ENCOUNTER (OUTPATIENT)
Dept: PHYSICAL THERAPY | Facility: HOSPITAL | Age: 51
Setting detail: THERAPIES SERIES
Discharge: HOME | End: 2021-07-19

## 2021-07-19 DIAGNOSIS — S83.231A COMPLEX TEAR OF MEDIAL MENISCUS OF RIGHT KNEE, UNSPECIFIED WHETHER OLD OR CURRENT TEAR, INITIAL ENCOUNTER: Primary | ICD-10-CM

## 2021-07-19 DIAGNOSIS — M23.306 DERANGEMENT OF MENISCUS, RIGHT: ICD-10-CM

## 2021-07-19 PROCEDURE — 97110 THERAPEUTIC EXERCISES: CPT

## 2021-07-19 NOTE — THERAPY TREATMENT NOTE
Outpatient Physical Therapy Ortho Treatment Note  [facility]     Patient Name: Leonard Beltran Jr.  : 1970  MRN: [id number]  Today's Date: 2021      Visit Date: 2021   Attendance: 4/5 of 12  Subjective improvement: 70%  Recert: n/a  MD Appointment: TBD      Visit Dx:    ICD-10-CM ICD-9-CM   1. Complex tear of medial meniscus of right knee, unspecified whether old or current tear, initial encounter  S83.231A 836.0   2. Derangement of meniscus, right  M23.306 717.5       There is no problem list on file for this patient.       Past Medical History:   Diagnosis Date   • Arthritis         Past Surgical History:   Procedure Laterality Date   • KNEE ACL RECONSTRUCTION Right    • KNEE ARTHROSCOPY Right     2 scopes and 1 other surgery for R knee injury from football about 26 years ago.   • TONSILLECTOMY         PT Ortho     Row Name 21 0800       Precautions and Contraindications    Precautions/Limitations  no known precautions/limitations  -EM      User Key  (r) = Recorded By, (t) = Taken By, (c) = Cosigned By    Initials Name Provider Type    Constantine Young, PTA Physical Therapy Assistant                      PT Assessment/Plan     Row Name 21 0800          PT Assessment    Assessment Comments  Pt barry tx well with initiation of cybex equipment. Pt displayed difficulty with SLS when standing on BOSU. Pt is progressing well as expected. Pt reports 70% improvement thus far.  -EM        PT Plan    PT Frequency  1x/week;2x/week  -EM     Predicted Duration of Therapy Intervention (PT)  2-3 wks  -EM     PT Plan Comments  Cont current POC. Progress as barry for hip/knee strengthening and to improve balance  -EM       User Key  (r) = Recorded By, (t) = Taken By, (c) = Cosigned By    Initials Name Provider Type    Constantine Young PTA Physical Therapy Assistant            OP Exercises     Row Name 21 0800             Subjective Comments    Subjective Comments  Pt is scheduled to RTW  "today light duty.  -EM         Subjective Pain    Able to rate subjective pain?  yes  -EM      Pre-Treatment Pain Level  0  -EM      Post-Treatment Pain Level  0  -EM         Exercise 1    Exercise Name 1  PRO II-Peak-4.0-Seat 9  -EM      Time 1  10'  -EM         Exercise 2    Exercise Name 2  Incline Calf S  -EM      Sets 2  3  -EM      Time 2  30\"  -EM         Exercise 3    Exercise Name 3  St Ham S  -EM      Sets 3  3  -EM      Time 3  30\"  -EM         Exercise 4    Exercise Name 4  St Knee Flexion S  -EM      Sets 4  3  -EM      Time 4  30\"  -EM         Exercise 5    Exercise Name 5  Fwd/Lat Step up:6\"  -EM      Sets 5  1  -EM      Reps 5  30  -EM         Exercise 6    Exercise Name 6  Eccentric Step Down: 6\"  -EM      Sets 6  1  -EM      Reps 6  30  -EM         Exercise 7    Exercise Name 7  Cybex LP  -EM      Sets 7  1  -EM      Reps 7  20  -EM      Additional Comments  100#  -EM         Exercise 8    Exercise Name 8  Cybex Hip Abd  -EM      Sets 8  1  -EM      Reps 8  20  -EM      Additional Comments  35#   -EM         Exercise 9    Exercise Name 9  Cybex Hip Add  -EM      Sets 9  1  -EM      Reps 9  20  -EM      Additional Comments  40#  -EM         Exercise 10    Exercise Name 10  Cybex Ham Curl  -EM      Sets 10  1  -EM      Reps 10  20  -EM      Additional Comments  50#  -EM         Exercise 11    Exercise Name 11  Cybex Leg Ext  -EM      Sets 11  1  -EM      Reps 11  20  -EM      Additional Comments  50#  -EM         Exercise 12    Exercise Name 12  St 3 Way Multi-Hip  -EM      Reps 12  1x20  -EM      Additional Comments  1 Plate  -EM         Exercise 13    Exercise Name 13  R SLS on BOSU  -EM      Sets 13  1  -EM      Reps 13  3  -EM      Additional Comments  10\", 6\", 9\"  -EM        User Key  (r) = Recorded By, (t) = Taken By, (c) = Cosigned By    Initials Name Provider Type    EM Constantine Argueta, PTA Physical Therapy Assistant                                          Time Calculation:   Start Time: " 0800  Stop Time: 0854  Time Calculation (min): 54 min  Total Timed Code Minutes- PT: 54 minute(s)  Therapy Charges for Today     Code Description Service Date Service Provider Modifiers Qty    53397895001  PT THER PROC EA 15 MIN 7/19/2021 Constantine Argueta, PTA GP 4                    Constantine Argueta, PTA  7/19/2021

## 2021-07-22 ENCOUNTER — HOSPITAL ENCOUNTER (OUTPATIENT)
Dept: PHYSICAL THERAPY | Facility: HOSPITAL | Age: 51
Setting detail: THERAPIES SERIES
Discharge: HOME | End: 2021-07-22

## 2021-07-22 DIAGNOSIS — S83.231A COMPLEX TEAR OF MEDIAL MENISCUS OF RIGHT KNEE, UNSPECIFIED WHETHER OLD OR CURRENT TEAR, INITIAL ENCOUNTER: Primary | ICD-10-CM

## 2021-07-22 DIAGNOSIS — M23.306 DERANGEMENT OF MENISCUS, RIGHT: ICD-10-CM

## 2021-07-22 PROCEDURE — 97112 NEUROMUSCULAR REEDUCATION: CPT | Performed by: PHYSICAL THERAPIST

## 2021-07-22 PROCEDURE — 97110 THERAPEUTIC EXERCISES: CPT | Performed by: PHYSICAL THERAPIST

## 2021-07-22 NOTE — THERAPY TREATMENT NOTE
"    Outpatient Physical Therapy Ortho Treatment Note  [facility]     Patient Name: Leonard Beltran Jr.  : 1970  MRN: [id number]  Today's Date: 2021      Visit Date: 2021  Attendance:   Subjective improvement: 70%  Recert: 21  MD Appointment: TBD    Visit Dx:    ICD-10-CM ICD-9-CM   1. Complex tear of medial meniscus of right knee, unspecified whether old or current tear, initial encounter  S83.231A 836.0   2. Derangement of meniscus, right  M23.306 717.5       There is no problem list on file for this patient.       Past Medical History:   Diagnosis Date   • Arthritis         Past Surgical History:   Procedure Laterality Date   • KNEE ACL RECONSTRUCTION Right    • KNEE ARTHROSCOPY Right     2 scopes and 1 other surgery for R knee injury from football about 26 years ago.   • TONSILLECTOMY                         PT Assessment/Plan     Row Name 21 0935          PT Assessment    Assessment Comments  Good tolerance to eccentric loading to the R knee, especially the quads. Slight knee instability noted with performing 553's on Airex.   -BS        PT Plan    PT Frequency  1x/week;2x/week  -BS     Predicted Duration of Therapy Intervention (PT)  2-3 weeks   -BS     PT Plan Comments  Re-check next week.  -BS       User Key  (r) = Recorded By, (t) = Taken By, (c) = Cosigned By    Initials Name Provider Type    Amandeep Kaur, PT Physical Therapist            OP Exercises     Row Name 21 0900             Subjective Comments    Subjective Comments  Patient reports balance is his biggest concern at this point with the right knee.  -BS         Subjective Pain    Able to rate subjective pain?  yes  -BS      Pre-Treatment Pain Level  0  -BS      Post-Treatment Pain Level  0  -BS         Exercise 1    Exercise Name 1  PRO II-Peak-4.0-Seat 9  -BS      Time 1  10'  -BS         Exercise 2    Exercise Name 2  Incline Calf S  -BS      Sets 2  3  -BS      Time 2  30\"  -BS         " "Exercise 3    Exercise Name 3  St Ham S  -BS      Sets 3  3  -BS      Time 3  30\"  -BS         Exercise 4    Exercise Name 4  St Knee Flexion S  -BS      Sets 4  3  -BS      Time 4  30\"  -BS         Exercise 5    Exercise Name 5  fwd step downs, 6\"   -BS      Sets 5  1  -BS      Reps 5  20  -BS         Exercise 6    Exercise Name 6  SLS on Airex  -BS      Sets 6  1  -BS      Reps 6  2  -BS      Time 6  30\" hold  -BS         Exercise 7    Exercise Name 7  553's on Airex  -BS      Sets 7  1  -BS      Reps 7  5 reps  -BS         Exercise 8    Exercise Name 8  Cybex LP:2L  -BS      Sets 8  1  -BS      Reps 8  20  -BS      Additional Comments  130#  -BS         Exercise 9    Exercise Name 9  Cybex LP: 1P  -BS      Sets 9  1  -BS      Reps 9  20  -BS      Additional Comments  130#  -BS        User Key  (r) = Recorded By, (t) = Taken By, (c) = Cosigned By    Initials Name Provider Type    Amandeep Kaur, PT Physical Therapist                       PT OP Goals     Row Name 07/22/21 1200 07/22/21 0935       PT Short Term Goals    STG Date to Achieve  --  07/16/21  -BS    STG 1  --  Patient independent with HEP  -BS    STG 1 Progress  --  Ongoing  -BS    STG 2  --  Improve R SLS to 30 sec with good R knee stability  -BS    STG 2 Progress  --  Met  -BS    STG 3  --  Improve R knee AROM to 0-130°  -BS    STG 3 Progress  --  Ongoing;Progressing  -BS    STG 4  --  Reduce R knee pain to 0/10 with prolonged standing.   -BS    STG 4 Progress  --  Ongoing;Progressing  -BS       Time Calculation    PT Goal Re-Cert Due Date  --  -BS  07/22/21  -BS      User Key  (r) = Recorded By, (t) = Taken By, (c) = Cosigned By    Initials Name Provider Type    Amandeep Kaur, PT Physical Therapist                         Time Calculation:   Start Time: 0935  Stop Time: 1014  Time Calculation (min): 39 min  Total Timed Code Minutes- PT: 39 minute(s)  Therapy Charges for Today     Code Description Service Date Service Provider Modifiers Qty    " [id number]  PT THER PROC EA 15 MIN 7/22/2021 Amandeep Yusuf, PT GP 2    60942166125 HC PT NEUROMUSC RE EDUCATION EA 15 MIN 7/22/2021 Amandeep Yusuf, PT GP 1                    Amandeep Yusuf, PT  7/22/2021

## 2021-08-02 ENCOUNTER — APPOINTMENT (OUTPATIENT)
Dept: PHYSICAL THERAPY | Facility: HOSPITAL | Age: 51
End: 2021-08-02

## 2021-08-05 ENCOUNTER — APPOINTMENT (OUTPATIENT)
Dept: PHYSICAL THERAPY | Facility: HOSPITAL | Age: 51
End: 2021-08-05

## 2021-12-06 ENCOUNTER — APPOINTMENT (OUTPATIENT)
Dept: GENERAL RADIOLOGY | Facility: HOSPITAL | Age: 51
End: 2021-12-06

## 2021-12-06 ENCOUNTER — HOSPITAL ENCOUNTER (EMERGENCY)
Facility: HOSPITAL | Age: 51
Discharge: HOME | End: 2021-12-06
Attending: EMERGENCY MEDICINE | Admitting: EMERGENCY MEDICINE

## 2021-12-06 VITALS
BODY MASS INDEX: 37.89 KG/M2 | SYSTOLIC BLOOD PRESSURE: 166 MMHG | HEART RATE: 70 BPM | OXYGEN SATURATION: 99 % | TEMPERATURE: 97.8 F | WEIGHT: 250 LBS | RESPIRATION RATE: 18 BRPM | HEIGHT: 68 IN | DIASTOLIC BLOOD PRESSURE: 87 MMHG

## 2021-12-06 DIAGNOSIS — I10 UNCONTROLLED HYPERTENSION: Primary | ICD-10-CM

## 2021-12-06 LAB
ALBUMIN SERPL-MCNC: 3.9 G/DL (ref 3.5–5.2)
ALBUMIN/GLOB SERPL: 1.6 G/DL
ALP SERPL-CCNC: 77 U/L (ref 39–117)
ALT SERPL W P-5'-P-CCNC: 13 U/L (ref 1–41)
ANION GAP SERPL CALCULATED.3IONS-SCNC: 7 MMOL/L (ref 5–15)
AST SERPL-CCNC: 17 U/L (ref 1–40)
BASOPHILS # BLD AUTO: 0.04 10*3/MM3 (ref 0–0.2)
BASOPHILS NFR BLD AUTO: 0.6 % (ref 0–1.5)
BILIRUB SERPL-MCNC: 0.5 MG/DL (ref 0–1.2)
BUN SERPL-MCNC: 10 MG/DL (ref 6–20)
BUN/CREAT SERPL: 9.3 (ref 7–25)
CALCIUM SPEC-SCNC: 9.2 MG/DL (ref 8.6–10.5)
CHLORIDE SERPL-SCNC: 105 MMOL/L (ref 98–107)
CO2 SERPL-SCNC: 27 MMOL/L (ref 22–29)
CREAT SERPL-MCNC: 1.07 MG/DL (ref 0.76–1.27)
DEPRECATED RDW RBC AUTO: 40.8 FL (ref 37–54)
EOSINOPHIL # BLD AUTO: 0.19 10*3/MM3 (ref 0–0.4)
EOSINOPHIL NFR BLD AUTO: 2.6 % (ref 0.3–6.2)
ERYTHROCYTE [DISTWIDTH] IN BLOOD BY AUTOMATED COUNT: 12.7 % (ref 12.3–15.4)
GFR SERPL CREATININE-BSD FRML MDRD: 88 ML/MIN/1.73
GLOBULIN UR ELPH-MCNC: 2.5 GM/DL
GLUCOSE SERPL-MCNC: 117 MG/DL (ref 65–99)
HCT VFR BLD AUTO: 44.6 % (ref 37.5–51)
HGB BLD-MCNC: 15.2 G/DL (ref 13–17.7)
HOLD SPECIMEN: NORMAL
HOLD SPECIMEN: NORMAL
IMM GRANULOCYTES # BLD AUTO: 0.02 10*3/MM3 (ref 0–0.05)
IMM GRANULOCYTES NFR BLD AUTO: 0.3 % (ref 0–0.5)
LYMPHOCYTES # BLD AUTO: 2.6 10*3/MM3 (ref 0.7–3.1)
LYMPHOCYTES NFR BLD AUTO: 36.3 % (ref 19.6–45.3)
MCH RBC QN AUTO: 30 PG (ref 26.6–33)
MCHC RBC AUTO-ENTMCNC: 34.1 G/DL (ref 31.5–35.7)
MCV RBC AUTO: 88 FL (ref 79–97)
MONOCYTES # BLD AUTO: 0.67 10*3/MM3 (ref 0.1–0.9)
MONOCYTES NFR BLD AUTO: 9.3 % (ref 5–12)
NEUTROPHILS NFR BLD AUTO: 3.65 10*3/MM3 (ref 1.7–7)
NEUTROPHILS NFR BLD AUTO: 50.9 % (ref 42.7–76)
NRBC BLD AUTO-RTO: 0 /100 WBC (ref 0–0.2)
NT-PROBNP SERPL-MCNC: 26.4 PG/ML (ref 0–900)
PLATELET # BLD AUTO: 310 10*3/MM3 (ref 140–450)
PMV BLD AUTO: 10.3 FL (ref 6–12)
POTASSIUM SERPL-SCNC: 3.8 MMOL/L (ref 3.5–5.2)
PROT SERPL-MCNC: 6.4 G/DL (ref 6–8.5)
RBC # BLD AUTO: 5.07 10*6/MM3 (ref 4.14–5.8)
SODIUM SERPL-SCNC: 139 MMOL/L (ref 136–145)
TROPONIN T SERPL-MCNC: <0.01 NG/ML (ref 0–0.03)
WBC NRBC COR # BLD: 7.17 10*3/MM3 (ref 3.4–10.8)
WHOLE BLOOD HOLD SPECIMEN: NORMAL

## 2021-12-06 PROCEDURE — 71045 X-RAY EXAM CHEST 1 VIEW: CPT

## 2021-12-06 PROCEDURE — 36415 COLL VENOUS BLD VENIPUNCTURE: CPT

## 2021-12-06 PROCEDURE — 83880 ASSAY OF NATRIURETIC PEPTIDE: CPT | Performed by: EMERGENCY MEDICINE

## 2021-12-06 PROCEDURE — 84484 ASSAY OF TROPONIN QUANT: CPT | Performed by: EMERGENCY MEDICINE

## 2021-12-06 PROCEDURE — 80053 COMPREHEN METABOLIC PANEL: CPT | Performed by: EMERGENCY MEDICINE

## 2021-12-06 PROCEDURE — 93010 ELECTROCARDIOGRAM REPORT: CPT | Performed by: INTERNAL MEDICINE

## 2021-12-06 PROCEDURE — 99283 EMERGENCY DEPT VISIT LOW MDM: CPT

## 2021-12-06 PROCEDURE — 85025 COMPLETE CBC W/AUTO DIFF WBC: CPT | Performed by: EMERGENCY MEDICINE

## 2021-12-06 PROCEDURE — 93005 ELECTROCARDIOGRAM TRACING: CPT | Performed by: EMERGENCY MEDICINE

## 2021-12-06 RX ORDER — LOSARTAN POTASSIUM 25 MG/1
25 TABLET ORAL DAILY
Qty: 30 TABLET | Refills: 0 | Status: SHIPPED | OUTPATIENT
Start: 2021-12-06

## 2021-12-06 RX ORDER — LOSARTAN POTASSIUM 25 MG/1
25 TABLET ORAL
Status: DISCONTINUED | OUTPATIENT
Start: 2021-12-06 | End: 2021-12-06 | Stop reason: HOSPADM

## 2021-12-06 RX ORDER — SODIUM CHLORIDE 0.9 % (FLUSH) 0.9 %
10 SYRINGE (ML) INJECTION AS NEEDED
Status: DISCONTINUED | OUTPATIENT
Start: 2021-12-06 | End: 2021-12-06 | Stop reason: HOSPADM

## 2021-12-06 RX ADMIN — LOSARTAN POTASSIUM 25 MG: 25 TABLET, FILM COATED ORAL at 10:16

## 2021-12-06 RX ADMIN — Medication 10 ML: at 09:00

## 2021-12-06 NOTE — DISCHARGE INSTRUCTIONS
Take low-salt diet, and regular exercise.  Keep a log of your blood pressure and follow-up with primary care for reevaluation.  Return to ER for uncontrolled hypertension, headache, blurry vision, numbness tingling focal weakness, chest pain palpitations or shortness of breath etc.

## 2021-12-06 NOTE — ED PROVIDER NOTES
Subjective   51-year-old male with a history of hypertension not taking any medication on dietary management presented in the ER with chief complaint of sudden onset dull aching generalized headache when he woke up this morning with tinnitus on the right side.  Does have history of tinnitus for a long time.  Denies any focal numbness tingling weakness.  He checked his blood pressure it was 190/110.  Denies any chest pain palpitations or shortness of breath.  Denies any difficulty speech or visual disturbance.  Headache is resolved currently, pressure was 180s on presentation and currently in 150s.  Patient reports he was taking losartan 25 mg which he stopped taking as his blood pressure was staying in 120s.  Denies any over-the-counter cough congestion medication use.      History provided by:  Patient      Review of Systems   Constitutional: Negative for chills and fever.   HENT: Negative for congestion, postnasal drip, sinus pressure, sinus pain and sore throat.    Respiratory: Negative for chest tightness and shortness of breath.    Cardiovascular: Negative for chest pain and palpitations.   Gastrointestinal: Negative for abdominal distention, anal bleeding, blood in stool, nausea and vomiting.   Genitourinary: Negative for enuresis.   Skin: Negative for color change.   Neurological: Positive for headaches. Negative for dizziness, seizures and weakness.   Psychiatric/Behavioral: Negative for agitation.       Past Medical History:   Diagnosis Date   • Arthritis        No Known Allergies    Past Surgical History:   Procedure Laterality Date   • KNEE ACL RECONSTRUCTION Right    • KNEE ARTHROSCOPY Right     2 scopes and 1 other surgery for R knee injury from football about 26 years ago.   • TONSILLECTOMY         History reviewed. No pertinent family history.    Social History     Socioeconomic History   • Marital status:    Tobacco Use   • Smoking status: Never Smoker   Substance and Sexual Activity   •  Alcohol use: Defer   • Sexual activity: Defer           Objective   Physical Exam  Vitals and nursing note reviewed.   Constitutional:       Appearance: Normal appearance.   HENT:      Head: Normocephalic and atraumatic.      Right Ear: Tympanic membrane, ear canal and external ear normal.      Left Ear: Tympanic membrane, ear canal and external ear normal.      Nose: Nose normal.      Mouth/Throat:      Mouth: Mucous membranes are moist.   Eyes:      Extraocular Movements: Extraocular movements intact.      Conjunctiva/sclera: Conjunctivae normal.      Pupils: Pupils are equal, round, and reactive to light.   Cardiovascular:      Rate and Rhythm: Normal rate and regular rhythm.   Pulmonary:      Effort: Pulmonary effort is normal.      Breath sounds: Normal breath sounds.   Abdominal:      General: Abdomen is flat. Bowel sounds are normal.      Palpations: Abdomen is soft.   Musculoskeletal:         General: Normal range of motion.      Cervical back: Normal range of motion and neck supple.   Skin:     General: Skin is warm.      Capillary Refill: Capillary refill takes less than 2 seconds.   Neurological:      General: No focal deficit present.      Mental Status: He is alert and oriented to person, place, and time. Mental status is at baseline.      GCS: GCS eye subscore is 4. GCS verbal subscore is 5. GCS motor subscore is 6.      Cranial Nerves: No cranial nerve deficit.      Sensory: No sensory deficit.      Motor: No weakness.      Coordination: Coordination normal.      Gait: Gait normal.      Deep Tendon Reflexes: Reflexes normal. Babinski sign absent on the right side. Babinski sign absent on the left side.      Reflex Scores:       Bicep reflexes are 2+ on the right side and 2+ on the left side.       Patellar reflexes are 2+ on the right side and 2+ on the left side.  Psychiatric:         Mood and Affect: Mood normal.         ECG 12 Lead      Date/Time: 12/6/2021 8:07 AM  Performed by: Kj Moody,  MD  Authorized by: Kj Moody MD   Interpreted by physician  Rhythm: sinus rhythm  Rate: normal  BPM: 60  QRS axis: normal  ST Segments: ST segments normal  T Waves: T waves normal  Clinical impression: non-specific ECG                 ED Course                                                 MDM  Number of Diagnoses or Management Options  Uncontrolled hypertension  Diagnosis management comments: Nonfocal neuro exam.  Headache is resolved on presentation.  EKG did not show any acute ischemic changes, unremarkable chest x-ray and chest pain work-up.  Blood pressure improved to 150s on its own.  I believe patient needs antihypertensive on a regular basis and restarted on losartan 25 mg, discussed signs symptoms of worsening needing return to ER which he seems understanding.       Amount and/or Complexity of Data Reviewed  Clinical lab tests: ordered and reviewed  Tests in the radiology section of CPT®: ordered and reviewed      Labs Reviewed   COMPREHENSIVE METABOLIC PANEL - Abnormal; Notable for the following components:       Result Value    Glucose 117 (*)     All other components within normal limits    Narrative:     GFR Normal >60  Chronic Kidney Disease <60  Kidney Failure <15     TROPONIN (IN-HOUSE) - Normal    Narrative:     Troponin T Reference Range:  <= 0.03 ng/mL-   Negative for AMI  >0.03 ng/mL-     Abnormal for myocardial necrosis.  Clinicians would have to utilize clinical acumen, EKG, Troponin and serial changes to determine if it is an Acute Myocardial Infarction or myocardial injury due to an underlying chronic condition.       Results may be falsely decreased if patient taking Biotin.     BNP (IN-HOUSE) - Normal    Narrative:     Among patients with dyspnea, NT-proBNP is highly sensitive for the detection of acute congestive heart failure. In addition NT-proBNP of <300 pg/ml effectively rules out acute congestive heart failure with 99% negative predictive value.    Results may be falsely  decreased if patient taking Biotin.     CBC WITH AUTO DIFFERENTIAL - Normal   RAINBOW DRAW    Narrative:     The following orders were created for panel order [place] Draw.  Procedure                               Abnormality         Status                     ---------                               -----------         ------                     Green Top (Gel)[[id number]]                                  Final result               Lavender Top[[id number]]                                     Final result               Gold Top - SST[[id number]]                                   Final result               Light Blue Top[[id number]]                                   Final result                 Please view results for these tests on the individual orders.   CBC AND DIFFERENTIAL    Narrative:     The following orders were created for panel order CBC & Differential.  Procedure                               Abnormality         Status                     ---------                               -----------         ------                     CBC Auto Differential[[id number]]        Normal              Final result                 Please view results for these tests on the individual orders.   GREEN TOP   LAVENDER TOP   GOLD TOP - SST   LIGHT BLUE TOP   EXTRA TUBES    Narrative:     The following orders were created for panel order Extra Tubes.  Procedure                               Abnormality         Status                     ---------                               -----------         ------                     Light Blue Top[[id number]]                                   Final result                 Please view results for these tests on the individual orders.   LIGHT BLUE TOP       XR Chest 1 View    Result Date: 12/6/2021  Narrative: PROCEDURE: Single view AP upright portable chest x-ray at 7:34 AM. INDICATION: Chest pain. High blood pressure. COMPARISON: None. FINDINGS: Heart, hilar and mediastinal structures normal.  Pulmonary vascularity normal. Lungs clear with no acute infiltrate or focal airspace disease. No pleural effusions. No pneumothorax. Bony structures unremarkable.     Impression: No acute disease. [id number] Electronically signed by:  John Lara MD  12/6/2021 8:04 AM [facility] Workstation: [id number]        Final diagnoses:   Uncontrolled hypertension       ED Disposition  ED Disposition     ED Disposition Condition Comment    Discharge Stable           Malorie Whitfield MD  [address]   [place] [address] [phone]    Call in 1 day  for re evaluation, even if feeling better    [facility] EMERGENCY DEPARTMENT  [address]  [facility] [address] [phone]    As needed, If symptoms worsen         Medication List      New Prescriptions    losartan 25 MG tablet  Commonly known as: COZAAR  Take 1 tablet by mouth Daily.           Where to Get Your Medications      These medications were sent to [facility] [place] AT [facility] - [phone]  - [phone] [address] [place], [facility] [address]    Phone: [phone]   · losartan 25 MG tablet          Kj Moody MD  12/06/21 0530

## 2021-12-06 NOTE — ED NOTES
Pt still with no chest pain, wife at bedside, no needs voiced.      Tyesha Ferreira, RN  12/06/21 5784

## 2021-12-13 LAB
QT INTERVAL: 388 MS
QTC INTERVAL: 388 MS